# Patient Record
Sex: FEMALE | Race: WHITE | NOT HISPANIC OR LATINO
[De-identification: names, ages, dates, MRNs, and addresses within clinical notes are randomized per-mention and may not be internally consistent; named-entity substitution may affect disease eponyms.]

---

## 2017-05-10 ENCOUNTER — RX RENEWAL (OUTPATIENT)
Age: 42
End: 2017-05-10

## 2017-07-04 ENCOUNTER — RX RENEWAL (OUTPATIENT)
Age: 42
End: 2017-07-04

## 2017-10-03 ENCOUNTER — RX RENEWAL (OUTPATIENT)
Age: 42
End: 2017-10-03

## 2018-01-02 ENCOUNTER — RX RENEWAL (OUTPATIENT)
Age: 43
End: 2018-01-02

## 2018-01-04 ENCOUNTER — APPOINTMENT (OUTPATIENT)
Dept: OBGYN | Facility: CLINIC | Age: 43
End: 2018-01-04

## 2018-03-26 ENCOUNTER — RX RENEWAL (OUTPATIENT)
Age: 43
End: 2018-03-26

## 2018-03-29 ENCOUNTER — APPOINTMENT (OUTPATIENT)
Dept: OBGYN | Facility: CLINIC | Age: 43
End: 2018-03-29
Payer: COMMERCIAL

## 2018-03-29 VITALS
BODY MASS INDEX: 20.7 KG/M2 | HEIGHT: 64 IN | DIASTOLIC BLOOD PRESSURE: 80 MMHG | SYSTOLIC BLOOD PRESSURE: 120 MMHG | WEIGHT: 121.25 LBS

## 2018-03-29 DIAGNOSIS — Z91.89 OTHER SPECIFIED PERSONAL RISK FACTORS, NOT ELSEWHERE CLASSIFIED: ICD-10-CM

## 2018-03-29 DIAGNOSIS — Z12.4 ENCOUNTER FOR SCREENING FOR MALIGNANT NEOPLASM OF CERVIX: ICD-10-CM

## 2018-03-29 PROCEDURE — 99396 PREV VISIT EST AGE 40-64: CPT

## 2018-04-02 LAB — HPV HIGH+LOW RISK DNA PNL CVX: NOT DETECTED

## 2018-04-03 LAB — PAP TEST: NORMAL

## 2019-03-02 ENCOUNTER — RX RENEWAL (OUTPATIENT)
Age: 44
End: 2019-03-02

## 2019-05-24 ENCOUNTER — APPOINTMENT (OUTPATIENT)
Dept: OBGYN | Facility: CLINIC | Age: 44
End: 2019-05-24
Payer: COMMERCIAL

## 2019-05-24 VITALS
WEIGHT: 117 LBS | DIASTOLIC BLOOD PRESSURE: 80 MMHG | HEIGHT: 64 IN | SYSTOLIC BLOOD PRESSURE: 120 MMHG | BODY MASS INDEX: 19.97 KG/M2

## 2019-05-24 PROCEDURE — 99396 PREV VISIT EST AGE 40-64: CPT

## 2019-05-24 NOTE — COUNSELING
[Nutrition] : nutrition [Breast Self Exam] : breast self exam [Exercise] : exercise [Vitamins/Supplements] : vitamins/supplements [Lab Results] : lab results

## 2019-05-24 NOTE — PHYSICAL EXAM
[Alert] : alert [Awake] : awake [Soft] : soft [Oriented x3] : oriented to person, place, and time [Normal] : uterus [No Bleeding] : there was no active vaginal bleeding [Uterine Adnexae] : were not tender and not enlarged [Mass] : no breast mass [Acute Distress] : no acute distress [Nipple Discharge] : no nipple discharge [Axillary LAD] : no axillary lymphadenopathy [Tender] : non tender

## 2019-05-24 NOTE — HISTORY OF PRESENT ILLNESS
[Definite:  ___ (Date)] : the last menstrual period was [unfilled] [Normal Amount/Duration] : was of a normal amount and duration [Regular Cycle Intervals] : periods have been regular [Frequency: Q ___ days] : menstrual periods occur approximately every [unfilled] days [Contraception] : uses contraception [Oral Contraceptives] : uses oral contraceptives [Last Mammogram ___] : Last Mammogram was [unfilled] [Last Pap ___] : Last cervical pap smear was [unfilled] [Sexually Active] : is sexually active [Reproductive Age] : is of reproductive age [Menstrual Cramps] : no menstrual cramps [Spotting Between  Menses] : no spotting between menses [On BCP at conception] : the patient was not on BCP at conception

## 2019-06-03 LAB — CYTOLOGY CVX/VAG DOC THIN PREP: NORMAL

## 2020-10-28 ENCOUNTER — RX RENEWAL (OUTPATIENT)
Age: 45
End: 2020-10-28

## 2020-11-30 ENCOUNTER — RX RENEWAL (OUTPATIENT)
Age: 45
End: 2020-11-30

## 2020-12-14 ENCOUNTER — APPOINTMENT (OUTPATIENT)
Dept: OBGYN | Facility: CLINIC | Age: 45
End: 2020-12-14
Payer: COMMERCIAL

## 2020-12-14 VITALS
DIASTOLIC BLOOD PRESSURE: 70 MMHG | WEIGHT: 117.06 LBS | HEIGHT: 64 IN | BODY MASS INDEX: 19.99 KG/M2 | SYSTOLIC BLOOD PRESSURE: 100 MMHG

## 2020-12-14 PROCEDURE — 99396 PREV VISIT EST AGE 40-64: CPT

## 2020-12-14 PROCEDURE — 99072 ADDL SUPL MATRL&STAF TM PHE: CPT

## 2020-12-14 NOTE — HISTORY OF PRESENT ILLNESS
[TextBox_4] : 46 yo pt presents for annual wwe. Feeling well, c/o mild HA during her period. Otherwise happy with Loryna.  [Mammogramdate] : 2/2020 [PapSmeardate] : 5/2019

## 2020-12-14 NOTE — PLAN
[FreeTextEntry1] : Discussed taking WOJCIECH's continuously or changing method of contraception, pt will consider and let us know. All questions answered

## 2020-12-21 LAB — CYTOLOGY CVX/VAG DOC THIN PREP: NORMAL

## 2022-05-01 ENCOUNTER — NON-APPOINTMENT (OUTPATIENT)
Age: 47
End: 2022-05-01

## 2022-05-03 RX ORDER — DROSPIRENONE AND ETHINYL ESTRADIOL 0.02-3(28)
3-0.02 KIT ORAL DAILY
Qty: 1 | Refills: 0 | Status: ACTIVE | COMMUNITY
Start: 2019-05-24 | End: 1900-01-01

## 2022-05-09 ENCOUNTER — APPOINTMENT (OUTPATIENT)
Dept: OBGYN | Facility: CLINIC | Age: 47
End: 2022-05-09
Payer: COMMERCIAL

## 2022-05-09 VITALS
SYSTOLIC BLOOD PRESSURE: 128 MMHG | OXYGEN SATURATION: 100 % | WEIGHT: 116 LBS | HEIGHT: 64 IN | DIASTOLIC BLOOD PRESSURE: 82 MMHG | TEMPERATURE: 98.4 F | BODY MASS INDEX: 19.81 KG/M2 | HEART RATE: 88 BPM

## 2022-05-09 PROCEDURE — 99396 PREV VISIT EST AGE 40-64: CPT

## 2022-05-09 NOTE — HISTORY OF PRESENT ILLNESS
[Patient reported PAP Smear was normal] : Patient reported PAP Smear was normal [N] : Patient reports normal menses [Y] : Positive pregnancy history [Regular Cycle Intervals] : periods have been regular [Currently Active] : currently active [No] : No [Yes] : Yes [Patient refuses STI testing] : Patient refuses STI testing [TextBox_19] : Has appointment this month [PapSmeardate] : 12/20 [LMPDate] : 04/23/22 [PGHxTotal] : 2 [[facility] : 2 [FreeTextEntry1] : 04/23/22

## 2022-05-11 LAB — HPV HIGH+LOW RISK DNA PNL CVX: NOT DETECTED

## 2022-05-13 LAB — CYTOLOGY CVX/VAG DOC THIN PREP: NORMAL

## 2023-05-10 ENCOUNTER — APPOINTMENT (OUTPATIENT)
Dept: OBGYN | Facility: CLINIC | Age: 48
End: 2023-05-10
Payer: COMMERCIAL

## 2023-05-10 VITALS
HEART RATE: 83 BPM | WEIGHT: 118 LBS | DIASTOLIC BLOOD PRESSURE: 89 MMHG | BODY MASS INDEX: 20.25 KG/M2 | SYSTOLIC BLOOD PRESSURE: 136 MMHG | OXYGEN SATURATION: 100 %

## 2023-05-10 DIAGNOSIS — Z01.419 ENCOUNTER FOR GYNECOLOGICAL EXAMINATION (GENERAL) (ROUTINE) W/OUT ABNORMAL FINDINGS: ICD-10-CM

## 2023-05-10 DIAGNOSIS — Z30.9 ENCOUNTER FOR CONTRACEPTIVE MANAGEMENT, UNSPECIFIED: ICD-10-CM

## 2023-05-10 PROCEDURE — 99396 PREV VISIT EST AGE 40-64: CPT

## 2023-05-10 NOTE — HISTORY OF PRESENT ILLNESS
[Patient reported PAP Smear was normal] : Patient reported PAP Smear was normal [N] : Patient reports normal menses [Oral Contraceptive] : uses oral contraception pills [Normal Amount/Duration] :  normal amount and duration [Regular Cycle Intervals] : periods have been regular [Frequency: Q ___ days] : menstrual periods occur approximately every [unfilled] days [Menstrual Cramps] : menstrual cramps [Previously active] : previously active [Men] : men [No] : No [Yes] : Yes [Y] : Positive pregnancy history [TextBox_19] : will follow up with PCP [PapSmeardate] : 5/9/2022 [TextBox_31] : HPV Not Detected [LMPDate] : 4/22/2023 [MensesFreq] : 28 [MensesLength] : 3-4 [PGHxTotal] : 2 [[facility]exLiving] : 2 [FreeTextEntry1] : 4/22/2023 [FreeTextEntry3] : Birth control pills

## 2023-05-12 ENCOUNTER — NON-APPOINTMENT (OUTPATIENT)
Age: 48
End: 2023-05-12

## 2023-05-15 ENCOUNTER — NON-APPOINTMENT (OUTPATIENT)
Age: 48
End: 2023-05-15

## 2023-05-22 LAB
CYTOLOGY CVX/VAG DOC THIN PREP: NORMAL
HPV HIGH+LOW RISK DNA PNL CVX: NOT DETECTED

## 2024-01-11 RX ORDER — DROSPIRENONE AND ETHINYL ESTRADIOL TABLETS 0.02-3(28)
3-0.02 KIT ORAL DAILY
Qty: 3 | Refills: 1 | Status: ACTIVE | COMMUNITY
Start: 2019-09-12 | End: 1900-01-01

## 2024-04-16 RX ORDER — DROSPIRENONE AND ETHINYL ESTRADIOL TABLETS 0.02-3(28)
3-0.02 KIT ORAL DAILY
Qty: 1 | Refills: 0 | Status: ACTIVE | COMMUNITY
Start: 2024-04-16 | End: 1900-01-01

## 2024-05-20 ENCOUNTER — APPOINTMENT (OUTPATIENT)
Dept: UROGYNECOLOGY | Facility: CLINIC | Age: 49
End: 2024-05-20